# Patient Record
Sex: FEMALE | Race: WHITE | NOT HISPANIC OR LATINO | Employment: FULL TIME | URBAN - METROPOLITAN AREA
[De-identification: names, ages, dates, MRNs, and addresses within clinical notes are randomized per-mention and may not be internally consistent; named-entity substitution may affect disease eponyms.]

---

## 2018-02-05 ENCOUNTER — OFFICE VISIT (OUTPATIENT)
Dept: OBSTETRICS AND GYNECOLOGY | Facility: CLINIC | Age: 33
End: 2018-02-05
Attending: OBSTETRICS & GYNECOLOGY
Payer: COMMERCIAL

## 2018-02-05 VITALS
BODY MASS INDEX: 19.62 KG/M2 | HEIGHT: 68 IN | SYSTOLIC BLOOD PRESSURE: 90 MMHG | DIASTOLIC BLOOD PRESSURE: 60 MMHG | WEIGHT: 129.44 LBS

## 2018-02-05 DIAGNOSIS — N89.8 VAGINAL DISCHARGE: ICD-10-CM

## 2018-02-05 DIAGNOSIS — Z01.419 WELL FEMALE EXAM WITH ROUTINE GYNECOLOGICAL EXAM: Primary | ICD-10-CM

## 2018-02-05 LAB
CANDIDA RRNA VAG QL PROBE: NEGATIVE
G VAGINALIS RRNA GENITAL QL PROBE: NEGATIVE
T VAGINALIS RRNA GENITAL QL PROBE: NEGATIVE

## 2018-02-05 PROCEDURE — 87480 CANDIDA DNA DIR PROBE: CPT

## 2018-02-05 PROCEDURE — 99395 PREV VISIT EST AGE 18-39: CPT | Mod: S$GLB,,, | Performed by: OBSTETRICS & GYNECOLOGY

## 2018-02-05 PROCEDURE — 99999 PR PBB SHADOW E&M-EST. PATIENT-LVL III: CPT | Mod: PBBFAC,,, | Performed by: OBSTETRICS & GYNECOLOGY

## 2018-02-05 RX ORDER — TOPIRAMATE 50 MG/1
CAPSULE, EXTENDED RELEASE ORAL
Refills: 0 | COMMUNITY
Start: 2017-11-10

## 2018-02-05 RX ORDER — TOPIRAMATE 100 MG/1
200 CAPSULE, EXTENDED RELEASE ORAL
COMMUNITY
Start: 2018-01-07

## 2018-02-05 RX ORDER — BRIVARACETAM 100 MG/1
TABLET, FILM COATED ORAL
COMMUNITY
Start: 2018-01-25

## 2018-02-05 RX ORDER — LEVOTHYROXINE SODIUM 50 UG/1
TABLET ORAL
Refills: 0 | COMMUNITY
Start: 2018-01-15 | End: 2020-01-24 | Stop reason: SDUPTHER

## 2018-02-05 RX ORDER — TOPIRAMATE 50 MG/1
CAPSULE, EXTENDED RELEASE ORAL
COMMUNITY

## 2019-12-11 ENCOUNTER — OFFICE VISIT (OUTPATIENT)
Dept: ENDOCRINOLOGY | Facility: CLINIC | Age: 34
End: 2019-12-11
Payer: COMMERCIAL

## 2019-12-11 VITALS
HEART RATE: 67 BPM | DIASTOLIC BLOOD PRESSURE: 76 MMHG | TEMPERATURE: 98 F | BODY MASS INDEX: 19.34 KG/M2 | WEIGHT: 127.63 LBS | SYSTOLIC BLOOD PRESSURE: 109 MMHG | HEIGHT: 68 IN

## 2019-12-11 DIAGNOSIS — E03.8 HYPOTHYROIDISM DUE TO HASHIMOTO'S THYROIDITIS: Primary | ICD-10-CM

## 2019-12-11 DIAGNOSIS — E06.3 HYPOTHYROIDISM DUE TO HASHIMOTO'S THYROIDITIS: Primary | ICD-10-CM

## 2019-12-11 PROCEDURE — 99203 PR OFFICE/OUTPT VISIT, NEW, LEVL III, 30-44 MIN: ICD-10-PCS | Mod: S$GLB,,, | Performed by: INTERNAL MEDICINE

## 2019-12-11 PROCEDURE — 99203 OFFICE O/P NEW LOW 30 MIN: CPT | Mod: S$GLB,,, | Performed by: INTERNAL MEDICINE

## 2019-12-11 RX ORDER — CLONAZEPAM 0.5 MG/1
0.5 TABLET ORAL 2 TIMES DAILY PRN
COMMUNITY

## 2019-12-11 RX ORDER — LEVOTHYROXINE SODIUM 75 UG/1
75 TABLET ORAL DAILY
COMMUNITY
End: 2019-12-11

## 2019-12-11 NOTE — PROGRESS NOTES
Subjective:      Chief Complaint: Establish Care (with new endocrinologist previously at [facility])    HPI: Charlene Lazaro is a 34 y.o. female who is here for an initial evaluation for thyroid.    With regards to her hypothyroidism:  Last labs 5/2019: TSH 1, free T4 1.18   12/2017  On older labs, TSH was 5.15 9/15/2017, started since around then.    Current medication:  levothyroxine  Current dose: alternates between 50 mcg and 75 mcg per day.    Pt takes thyroid medication in the early morning on an empty stomach with other medications and water. Waits an hour before coffee/food. Denies missed doses/running out.    Current symptoms: Has some trouble getting to sleep as well as staying asleep.   On klonopin as well.  Also joint pains in hands and feet, ankles (has hx raynaud's). Some diarrhea, sometimes constipation. +anxiety, mood symptoms (chronic)  +FH thyroid disease.     Pt denies:  Not many seizures lately.  No palpitations, dysphagia.     Reviewed past medical, family, social history and updated as appropriate.    Review of Systems   Constitutional: Positive for fatigue (in the morning, improves after coffee). Negative for unexpected weight change (gained 4 lbs or so lately).   HENT: Negative for trouble swallowing.    Eyes: Negative for visual disturbance.   Respiratory: Negative for shortness of breath.    Cardiovascular: Negative for palpitations.   Gastrointestinal: Positive for constipation and diarrhea.   Endocrine: Positive for cold intolerance. Negative for heat intolerance.   Genitourinary: Negative for frequency.   Musculoskeletal: Negative for back pain.        Joint pains, raynaud    Neurological: Positive for seizures (rare). Negative for light-headedness.   Psychiatric/Behavioral: Positive for sleep disturbance.     Objective:     Vitals:    12/11/19 1331   BP: 109/76   Pulse: 67   Temp: 97.8 °F (36.6 °C)     BP Readings from Last 5 Encounters:   12/11/19 109/76   02/05/18 90/60   09/01/16  112/70   07/26/16 120/60   05/12/16 100/60     Physical Exam   Constitutional: She is oriented to person, place, and time. She appears well-developed and well-nourished.   HENT:   Head: Normocephalic and atraumatic.   Eyes: EOM are normal.   Neck: Normal range of motion. Neck supple. No thyromegaly (no nodules) present.   Cardiovascular: Normal rate and regular rhythm.   No murmur heard.  Pulmonary/Chest: Effort normal and breath sounds normal.   Abdominal: Soft. She exhibits no distension and no mass. There is no tenderness.   Musculoskeletal: Normal range of motion. She exhibits no edema or tenderness.   Neurological: She is alert and oriented to person, place, and time.   Faint/fine tremor in outstretched fingers   Psychiatric: She has a normal mood and affect. Judgment normal.   Vitals reviewed.    Wt Readings from Last 5 Encounters:   12/11/19 1331 57.9 kg (127 lb 10.3 oz)   02/05/18 1435 58.7 kg (129 lb 6.6 oz)   09/01/16 1037 67.4 kg (148 lb 9.4 oz)   07/26/16 1401 68.1 kg (150 lb 2.1 oz)   05/12/16 1117 67.1 kg (147 lb 14.9 oz)     Assessment/Plan:     Hypothyroidism due to Hashimoto's thyroiditis   - Clinically and biochemically euthyroid  On review of patient's prior lab evaluation from 2017, looks like she did have slightly high TSH into the 5s and elevated TPO but normal free T4   - was started on levothyroxine at that time   - discussed with patient that endocrine society recommends starting treatment for TSH >10, so I'm not sure if I would've started her on levothyroxine right away back then. But her TPO elevation does give her a risk of developing hypothyroidism at some point. Considered just stopping levothyroxine and seeing what happens, but with TPO elevation, maybe she has become truly hypothyroid in the intervening 2 years in which case stopping the levothyroxine would lead to hypothyroid symptoms   - currently she is alternating between 50 and 75 mcg/day, don't really feel that is necessary. If  she did need average of 60-65 mcg per day, could get to that dose with 50 mcg each day and then an extra pill or two on the weekends.   - for now, will decrease to 50 mcg per day. Recheck labs after 6-8 weeks and consider further reduction to 25 mcg if TSH still on the lower side, vs continue there if TSH gets closer to the top of normal    Discussed with patient goal is a normal TSH. Avoid exogenous hyperthyroidism as this can accelerate bone loss and increase risk of CV complications.    Ok to take selenium 100 mcg bid to try and decrease thyroid antibodies         Follow up in about 1 year (around 12/11/2020). likely with additional labs in between

## 2019-12-11 NOTE — PATIENT INSTRUCTIONS
For the thyroid: Decrease to 50 micrograms per day. You don't need to take the 75 micrograms dose anymore. Recheck blood test after 6-8 weeks or so.    You can consider selenium to try and decrease thyroid antibodies, and maybe improve a few symptoms (but studies aren't really 100% on those being very effective).      Hypothyroidism       You have hypothyroidism. This means your thyroid gland is not making enough thyroid hormone. This hormone is vital to body growth and metabolism. If you dont make enough, many body processes slow down. This can cause symptoms throughout the body. Hypothyroidism can range from mild to severe. The most severe form is called myxedema.  There are a number of causes of hypothyroidism. A common cause is Hashimotos disease. This disease causes the bodys own immune system to attack the thyroid gland. When you have certain treatments, such as surgery to remove the thyroid gland, this can also cause hypothyroidism.  Symptoms of hypothyroidism can include:  · Fatigue  · Trouble concentrating or thinking clearly; forgetfulness  · Dry skin  · Hair loss  · Weight gain  · Low tolerance to cold  · Constipation  · Depression  · Personality changes  · Tingling or prickling of the hands or feet  · Heavy, absent, or irregular periods (women only)  Older adults may sometimes have other symptoms. These can include:  · Muscle aches and weakness  · Confusion  · Incontinence (unable to control urine or stool)  · Trouble moving around  · Falling  Treatment for hypothyroidism involves taking thyroid hormone pills daily. These pills replace the hormone your thyroid doesnt make. You will likely need to take a daily pill for the rest of your life. Tips for taking this medicine are given below.  Home care  Tips for taking your medicine  · Take your thyroid hormone pills as prescribed by your healthcare provider. This is most often 1 pill a day on an empty stomach. Use a pillbox labeled with the days of the  week. This will help you remember to take your pill each day.  · Dont take products that contain iron and calcium or antacids within 4 hours of taking your thyroid hormone pills.  · Dont take other medicines with your thyroid hormone pill without checking with your provider first.  · Tell your provider if you have any side effects from your medicines that bother you.  · Never change the dosage or stop taking your thyroid pills without talking to your provider first.  General care  · Always talk with your provider before trying other medicines or treatments for your thyroid problem.  · If you see other healthcare providers, be sure to let them know about your thyroid problem.  Follow-up care  See your healthcare provider for checkups as advised. You may need regular tests to check the level of thyroid hormone in your blood.  When to seek medical advice  Call your healthcare provider right away if any of these occur:  · New symptoms develop  · Symptoms return, continue, or worsen even after treatment  · Extreme fatigue  · Puffy hands, face, or feet  · Fast or irregular heartbeat  · Confusion  Call 911  Call 911 right away if any of these occur:  · Fainting  · Chest pain  · Shortness of breath or trouble breathing  Date Last Reviewed: 8/24/2015  © 3860-0155 [employer]. [address] [place] [address], [place] [address]. All rights reserved. This information is not intended as a substitute for professional medical care. Always follow your healthcare professional's instructions.

## 2019-12-12 PROBLEM — E06.3 HYPOTHYROIDISM DUE TO HASHIMOTO'S THYROIDITIS: Status: ACTIVE | Noted: 2019-12-12

## 2019-12-12 PROBLEM — E03.8 HYPOTHYROIDISM DUE TO HASHIMOTO'S THYROIDITIS: Status: ACTIVE | Noted: 2019-12-12

## 2019-12-12 NOTE — ASSESSMENT & PLAN NOTE
- Clinically and biochemically euthyroid  On review of patient's prior lab evaluation from 2017, looks like she did have slightly high TSH into the 5s and elevated TPO but normal free T4   - was started on levothyroxine at that time   - discussed with patient that endocrine society recommends starting treatment for TSH >10, so I'm not sure if I would've started her on levothyroxine right away back then. But her TPO elevation does give her a risk of developing hypothyroidism at some point. Considered just stopping levothyroxine and seeing what happens, but with TPO elevation, maybe she has become truly hypothyroid in the intervening 2 years in which case stopping the levothyroxine would lead to hypothyroid symptoms   - currently she is alternating between 50 and 75 mcg/day, don't really feel that is necessary. If she did need average of 60-65 mcg per day, could get to that dose with 50 mcg each day and then an extra pill or two on the weekends.   - for now, will decrease to 50 mcg per day. Recheck labs after 6-8 weeks and consider further reduction to 25 mcg if TSH still on the lower side, vs continue there if TSH gets closer to the top of normal    Discussed with patient goal is a normal TSH. Avoid exogenous hyperthyroidism as this can accelerate bone loss and increase risk of CV complications.    Ok to take selenium 100 mcg bid to try and decrease thyroid antibodies

## 2020-01-24 RX ORDER — LEVOTHYROXINE SODIUM 50 UG/1
50 TABLET ORAL
Qty: 90 TABLET | Refills: 3 | Status: SHIPPED | OUTPATIENT
Start: 2020-01-24

## 2020-06-12 NOTE — PROGRESS NOTES
SUBJECTIVE:   32 y.o. female   for routine gyn exam. No LMP recorded (lmp unknown). Patient has had an implant..  She has Mirena.  C/o vaginal d/c and odor.         Past Medical History:   Diagnosis Date    Seizures      Past Surgical History:   Procedure Laterality Date    TONSILLECTOMY       Social History     Social History    Marital status:      Spouse name: N/A    Number of children: N/A    Years of education: N/A     Occupational History    Not on file.     Social History Main Topics    Smoking status: Never Smoker    Smokeless tobacco: Never Used    Alcohol use Yes      Comment: social    Drug use: No    Sexual activity: Yes     Partners: Male     Birth control/ protection: IUD     Other Topics Concern    Not on file     Social History Narrative    No narrative on file     Family History   Problem Relation Age of Onset    Breast cancer Paternal Grandmother 70    Breast cancer Maternal Grandmother 58    Colon cancer Neg Hx     Ovarian cancer Neg Hx      OB History    Para Term  AB Living   1       1 0   SAB TAB Ectopic Multiple Live Births                  # Outcome Date GA Lbr Diaz/2nd Weight Sex Delivery Anes PTL Lv   1 AB                     Current Outpatient Prescriptions   Medication Sig Dispense Refill    BRIVIACT 100 mg Tab       BRIVIACT 50 mg Tab TK ONE T PO BID PRN  0    levothyroxine (SYNTHROID) 50 MCG tablet TK 1 T PO QD  0    TROKENDI  mg Cp24       TROKENDI XR 50 mg Cp24       TROKENDI XR 50 mg Cp24 TK 1 C PO QD  0     No current facility-administered medications for this visit.      Allergies: Flagyl [metronidazole] and Zonegran [zonisamide]     ROS:  Constitutional: no weight loss, weight gain, fever, fatigue  Eyes:  No vision changes, glasses/contacts  ENT/Mouth: No ulcers, sinus problems, ears ringing, headache  Cardiovascular: No inability to lie flat, chest pain, exercise intolerance, swelling, heart palpitations  Respiratory: No  "wheezing, coughing blood, shortness of breath, or cough  Gastrointestinal: No diarrhea, bloody stool, nausea/vomiting, constipation, gas, hemorrhoids  Genitourinary: No blood in urine, painful urination, urgency of urination, frequency of urination, incomplete emptying, incontinence, abnormal bleeding, painful periods, heavy periods, +vaginal discharge, +vaginal odor, painful intercourse, sexual problems, bleeding after intercourse.  Musculoskeletal: No muscle weakness  Skin/Breast: No painful breasts, nipple discharge, masses, rash, ulcers  Neurological: No passing out, seizures, numbness, headache  Endocrine: No diabetes, hypothyroid, hyperthyroid, hot flashes, hair loss, abnormal hair growth, ance  Psychiatric: No depression, crying  Hematologic: No bruises, bleeding, swollen lymph nodes, anemia.      OBJECTIVE:   The patient appears well, alert, oriented x 3, in no distress.  BP 90/60   Ht 5' 8" (1.727 m)   Wt 58.7 kg (129 lb 6.6 oz)   LMP  (LMP Unknown)   BMI 19.68 kg/m²   NECK: no thyromegaly, trachea midline  SKIN: no acne, striae, hirsutism  CHEST: CTAB  CV: RRR  BREAST EXAM: breasts appear normal, no suspicious masses, no skin or nipple changes or axillary nodes  ABDOMEN: no hernias, masses, or hepatosplenomegaly  GENITALIA: normal external genitalia, no erythema, bloody discharge  URETHRA: normal urethra, normal urethral meatus  VAGINA: Normal  CERVIX: no lesions or cervical motion tenderness.  IUD strings seen  UTERUS: normal size, contour, position, consistency, mobility, non-tender  ADNEXA: no mass, fullness, tenderness      ASSESSMENT:   1. Well female exam with routine gynecological exam     2. Vaginal discharge  Vaginosis Screen by DNA Probe       PLAN:   Discussed normal exam, Affirm, bloody d/c today, bleeding profile on Mirena  Orders Placed This Encounter    Vaginosis Screen by DNA Probe     Return to clinic in 1 year  " 1950

## 2021-04-16 ENCOUNTER — PATIENT MESSAGE (OUTPATIENT)
Dept: RESEARCH | Facility: HOSPITAL | Age: 36
End: 2021-04-16